# Patient Record
Sex: FEMALE | Race: BLACK OR AFRICAN AMERICAN | NOT HISPANIC OR LATINO | URBAN - METROPOLITAN AREA
[De-identification: names, ages, dates, MRNs, and addresses within clinical notes are randomized per-mention and may not be internally consistent; named-entity substitution may affect disease eponyms.]

---

## 2018-10-05 ENCOUNTER — OFFICE (OUTPATIENT)
Dept: URBAN - METROPOLITAN AREA CLINIC 19 | Facility: CLINIC | Age: 68
End: 2018-10-05

## 2018-10-05 VITALS
DIASTOLIC BLOOD PRESSURE: 89 MMHG | HEIGHT: 64 IN | SYSTOLIC BLOOD PRESSURE: 117 MMHG | WEIGHT: 188 LBS | HEART RATE: 96 BPM

## 2018-10-05 DIAGNOSIS — R63.4 ABNORMAL WEIGHT LOSS: ICD-10-CM

## 2018-10-05 DIAGNOSIS — R10.10 UPPER ABDOMINAL PAIN, UNSPECIFIED: ICD-10-CM

## 2018-10-05 DIAGNOSIS — R11.10 VOMITING, UNSPECIFIED: ICD-10-CM

## 2018-10-05 DIAGNOSIS — R10.13 EPIGASTRIC PAIN: ICD-10-CM

## 2018-10-05 DIAGNOSIS — R05 COUGH: ICD-10-CM

## 2018-10-05 DIAGNOSIS — Z86.010 PERSONAL HISTORY OF COLONIC POLYPS: ICD-10-CM

## 2018-10-05 DIAGNOSIS — M06.9 RHEUMATOID ARTHRITIS, UNSPECIFIED: ICD-10-CM

## 2018-10-05 DIAGNOSIS — K59.00 CONSTIPATION, UNSPECIFIED: ICD-10-CM

## 2018-10-05 DIAGNOSIS — R63.0 ANOREXIA: ICD-10-CM

## 2018-10-05 DIAGNOSIS — R14.0 ABDOMINAL DISTENSION (GASEOUS): ICD-10-CM

## 2018-10-05 PROCEDURE — 99204 OFFICE O/P NEW MOD 45 MIN: CPT | Performed by: INTERNAL MEDICINE

## 2018-10-05 RX ORDER — ONDANSETRON 4 MG/1
16 TABLET, ORALLY DISINTEGRATING ORAL
Qty: 60 | Refills: 3 | Status: COMPLETED
Start: 2018-10-05 | End: 2021-11-10

## 2018-10-05 RX ORDER — OMEPRAZOLE 20 MG/1
CAPSULE, DELAYED RELEASE ORAL
Qty: 0 | Refills: 0 | Status: COMPLETED
End: 2018-10-05

## 2018-10-05 RX ORDER — SODIUM PICOSULFATE, MAGNESIUM OXIDE, AND ANHYDROUS CITRIC ACID 10; 3.5; 12 MG/160ML; G/160ML; G/160ML
LIQUID ORAL
Qty: 1 | Refills: 0 | Status: COMPLETED
Start: 2018-10-05 | End: 2018-11-07

## 2018-10-05 RX ORDER — PANTOPRAZOLE SODIUM 40 MG/1
40 TABLET, DELAYED RELEASE ORAL
Qty: 30 | Refills: 11 | Status: COMPLETED
Start: 2018-10-05 | End: 2021-11-10

## 2018-11-07 ENCOUNTER — AMBULATORY SURGICAL CENTER (OUTPATIENT)
Dept: URBAN - METROPOLITAN AREA SURGERY 2 | Facility: SURGERY | Age: 68
End: 2018-11-07

## 2018-11-07 ENCOUNTER — AMBULATORY SURGICAL CENTER (OUTPATIENT)
Dept: URBAN - METROPOLITAN AREA SURGERY 2 | Facility: SURGERY | Age: 68
End: 2018-11-07
Payer: MEDICARE

## 2018-11-07 ENCOUNTER — OFFICE (OUTPATIENT)
Dept: URBAN - METROPOLITAN AREA PATHOLOGY 22 | Facility: PATHOLOGY | Age: 68
End: 2018-11-07

## 2018-11-07 VITALS
DIASTOLIC BLOOD PRESSURE: 76 MMHG | DIASTOLIC BLOOD PRESSURE: 76 MMHG | SYSTOLIC BLOOD PRESSURE: 110 MMHG | TEMPERATURE: 98.2 F | RESPIRATION RATE: 18 BRPM | TEMPERATURE: 98.6 F | DIASTOLIC BLOOD PRESSURE: 57 MMHG | HEART RATE: 76 BPM | HEART RATE: 95 BPM | HEART RATE: 99 BPM | HEIGHT: 64 IN | SYSTOLIC BLOOD PRESSURE: 114 MMHG | SYSTOLIC BLOOD PRESSURE: 115 MMHG | SYSTOLIC BLOOD PRESSURE: 110 MMHG | DIASTOLIC BLOOD PRESSURE: 57 MMHG | DIASTOLIC BLOOD PRESSURE: 67 MMHG | HEART RATE: 91 BPM | SYSTOLIC BLOOD PRESSURE: 97 MMHG | HEART RATE: 76 BPM | OXYGEN SATURATION: 97 % | TEMPERATURE: 98.6 F | SYSTOLIC BLOOD PRESSURE: 114 MMHG | RESPIRATION RATE: 20 BRPM | DIASTOLIC BLOOD PRESSURE: 71 MMHG | WEIGHT: 190 LBS | DIASTOLIC BLOOD PRESSURE: 67 MMHG | RESPIRATION RATE: 18 BRPM | OXYGEN SATURATION: 98 % | OXYGEN SATURATION: 98 % | OXYGEN SATURATION: 97 % | HEART RATE: 99 BPM | HEART RATE: 95 BPM | TEMPERATURE: 98.2 F | RESPIRATION RATE: 20 BRPM | SYSTOLIC BLOOD PRESSURE: 115 MMHG | SYSTOLIC BLOOD PRESSURE: 97 MMHG | DIASTOLIC BLOOD PRESSURE: 71 MMHG | OXYGEN SATURATION: 96 % | HEART RATE: 91 BPM | OXYGEN SATURATION: 96 % | WEIGHT: 190 LBS | HEIGHT: 64 IN

## 2018-11-07 DIAGNOSIS — K21.0 GASTRO-ESOPHAGEAL REFLUX DISEASE WITH ESOPHAGITIS: ICD-10-CM

## 2018-11-07 DIAGNOSIS — R11.10 VOMITING, UNSPECIFIED: ICD-10-CM

## 2018-11-07 DIAGNOSIS — R10.13 EPIGASTRIC PAIN: ICD-10-CM

## 2018-11-07 DIAGNOSIS — Z86.010 PERSONAL HISTORY OF COLONIC POLYPS: ICD-10-CM

## 2018-11-07 DIAGNOSIS — K62.89 OTHER SPECIFIED DISEASES OF ANUS AND RECTUM: ICD-10-CM

## 2018-11-07 DIAGNOSIS — K63.5 POLYP OF COLON: ICD-10-CM

## 2018-11-07 DIAGNOSIS — K31.7 POLYP OF STOMACH AND DUODENUM: ICD-10-CM

## 2018-11-07 DIAGNOSIS — K57.30 DIVERTICULOSIS OF LARGE INTESTINE WITHOUT PERFORATION OR ABS: ICD-10-CM

## 2018-11-07 DIAGNOSIS — K31.89 OTHER DISEASES OF STOMACH AND DUODENUM: ICD-10-CM

## 2018-11-07 DIAGNOSIS — K64.4 RESIDUAL HEMORRHOIDAL SKIN TAGS: ICD-10-CM

## 2018-11-07 PROBLEM — D12.2 BENIGN NEOPLASM OF ASCENDING COLON: Status: ACTIVE | Noted: 2018-11-07

## 2018-11-07 PROBLEM — K29.70 GASTRITIS, UNSPECIFIED, WITHOUT BLEEDING: Status: ACTIVE | Noted: 2018-11-07

## 2018-11-07 PROBLEM — D12.4 BENIGN NEOPLASM OF DESCENDING COLON: Status: ACTIVE | Noted: 2018-11-07

## 2018-11-07 PROCEDURE — G8907 PT DOC NO EVENTS ON DISCHARG: HCPCS | Performed by: INTERNAL MEDICINE

## 2018-11-07 PROCEDURE — 45380 COLONOSCOPY AND BIOPSY: CPT | Mod: PT | Performed by: INTERNAL MEDICINE

## 2018-11-07 PROCEDURE — 43239 EGD BIOPSY SINGLE/MULTIPLE: CPT | Mod: 51 | Performed by: INTERNAL MEDICINE

## 2018-11-07 PROCEDURE — G8918 PT W/O PREOP ORDER IV AB PRO: HCPCS | Performed by: INTERNAL MEDICINE

## 2018-11-07 PROCEDURE — 88341 IMHCHEM/IMCYTCHM EA ADD ANTB: CPT | Performed by: INTERNAL MEDICINE

## 2018-11-07 PROCEDURE — 45380 COLONOSCOPY AND BIOPSY: CPT | Performed by: INTERNAL MEDICINE

## 2018-11-07 PROCEDURE — 88342 IMHCHEM/IMCYTCHM 1ST ANTB: CPT | Performed by: INTERNAL MEDICINE

## 2018-11-07 PROCEDURE — 88313 SPECIAL STAINS GROUP 2: CPT | Performed by: INTERNAL MEDICINE

## 2018-11-07 PROCEDURE — 88305 TISSUE EXAM BY PATHOLOGIST: CPT | Performed by: INTERNAL MEDICINE

## 2019-07-26 ENCOUNTER — OFFICE (OUTPATIENT)
Dept: URBAN - METROPOLITAN AREA CLINIC 19 | Facility: CLINIC | Age: 69
End: 2019-07-26

## 2019-07-26 VITALS
WEIGHT: 185 LBS | HEIGHT: 64 IN | DIASTOLIC BLOOD PRESSURE: 90 MMHG | SYSTOLIC BLOOD PRESSURE: 120 MMHG | HEART RATE: 111 BPM

## 2019-07-26 DIAGNOSIS — C18.2 MALIGNANT NEOPLASM OF ASCENDING COLON: ICD-10-CM

## 2019-07-26 DIAGNOSIS — K58.0 IRRITABLE BOWEL SYNDROME WITH DIARRHEA: ICD-10-CM

## 2019-07-26 DIAGNOSIS — R10.9 UNSPECIFIED ABDOMINAL PAIN: ICD-10-CM

## 2019-07-26 PROCEDURE — 99213 OFFICE O/P EST LOW 20 MIN: CPT | Performed by: INTERNAL MEDICINE

## 2019-07-26 RX ORDER — DIPHENOXYLATE HYDROCHLORIDE AND ATROPINE SULFATE 2.5; .025 MG/1; MG/1
5 TABLET ORAL
Qty: 60 | Refills: 5 | Status: ACTIVE
Start: 2019-07-26

## 2019-11-26 ENCOUNTER — OFFICE (OUTPATIENT)
Dept: URBAN - METROPOLITAN AREA CLINIC 19 | Facility: CLINIC | Age: 69
End: 2019-11-26

## 2021-09-01 ENCOUNTER — OFFICE (OUTPATIENT)
Dept: URBAN - METROPOLITAN AREA CLINIC 19 | Facility: CLINIC | Age: 71
End: 2021-09-01

## 2021-09-01 VITALS
OXYGEN SATURATION: 95 % | DIASTOLIC BLOOD PRESSURE: 90 MMHG | HEIGHT: 64 IN | WEIGHT: 197 LBS | HEART RATE: 109 BPM | SYSTOLIC BLOOD PRESSURE: 125 MMHG

## 2021-09-01 DIAGNOSIS — R68.81 EARLY SATIETY: ICD-10-CM

## 2021-09-01 DIAGNOSIS — J93.83 OTHER PNEUMOTHORAX: ICD-10-CM

## 2021-09-01 DIAGNOSIS — R13.19 OTHER DYSPHAGIA: ICD-10-CM

## 2021-09-01 PROCEDURE — 99213 OFFICE O/P EST LOW 20 MIN: CPT | Performed by: INTERNAL MEDICINE

## 2021-09-01 RX ORDER — PANTOPRAZOLE SODIUM 40 MG/1
40 TABLET, DELAYED RELEASE ORAL
Qty: 30 | Refills: 11 | Status: COMPLETED
Start: 2018-10-05 | End: 2021-11-10

## 2021-11-10 ENCOUNTER — OFFICE (OUTPATIENT)
Dept: URBAN - METROPOLITAN AREA CLINIC 19 | Facility: CLINIC | Age: 71
End: 2021-11-10

## 2021-11-10 VITALS
WEIGHT: 206 LBS | SYSTOLIC BLOOD PRESSURE: 136 MMHG | HEIGHT: 64 IN | HEART RATE: 86 BPM | DIASTOLIC BLOOD PRESSURE: 85 MMHG | OXYGEN SATURATION: 97 %

## 2021-11-10 DIAGNOSIS — Z85.038 PERSONAL HISTORY OF OTHER MALIGNANT NEOPLASM OF LARGE INTEST: ICD-10-CM

## 2021-11-10 DIAGNOSIS — Z86.010 PERSONAL HISTORY OF COLONIC POLYPS: ICD-10-CM

## 2021-11-10 DIAGNOSIS — R13.19 OTHER DYSPHAGIA: ICD-10-CM

## 2021-11-10 PROCEDURE — 99213 OFFICE O/P EST LOW 20 MIN: CPT | Performed by: INTERNAL MEDICINE

## 2021-11-10 RX ORDER — PANTOPRAZOLE 40 MG/1
40 TABLET, DELAYED RELEASE ORAL
Qty: 90 | Refills: 3 | Status: ACTIVE
Start: 2021-11-10

## 2022-12-19 ENCOUNTER — OFFICE (OUTPATIENT)
Dept: URBAN - METROPOLITAN AREA CLINIC 19 | Facility: CLINIC | Age: 72
End: 2022-12-19

## 2022-12-19 VITALS
SYSTOLIC BLOOD PRESSURE: 139 MMHG | HEART RATE: 102 BPM | DIASTOLIC BLOOD PRESSURE: 93 MMHG | HEIGHT: 64 IN | OXYGEN SATURATION: 93 %

## 2022-12-19 DIAGNOSIS — Z85.038 PERSONAL HISTORY OF OTHER MALIGNANT NEOPLASM OF LARGE INTEST: ICD-10-CM

## 2022-12-19 DIAGNOSIS — K22.4 DYSKINESIA OF ESOPHAGUS: ICD-10-CM

## 2022-12-19 DIAGNOSIS — R14.0 ABDOMINAL DISTENSION (GASEOUS): ICD-10-CM

## 2022-12-19 PROCEDURE — 99213 OFFICE O/P EST LOW 20 MIN: CPT | Performed by: NURSE PRACTITIONER

## 2022-12-19 NOTE — SERVICENOTES
She is asymptomatic from GI standpoint. Due for colon cancer screening in November 2023. She is in recall system.

## 2022-12-19 NOTE — SERVICEHPINOTES
Mrs. Mckoy is a 72-year-old female previously followed for colon cancer. Was new to DR. Zepeda in 11/2018 for generalized bloating and discomfort. Discomfort more so in upper abdomen/epigastric region. Additionally, was having a few episodes of vomiting, early satiety with anorexia and lost 8 lbs. over the last few weeks due to loss of appetite. Colonoscopy completed 2018, anastomosis noted, small hyperplastic polyps. EGD with gastritis, GERD, and hyperplastic polyp (removed).Seen in September for evaluation for solid food dysphagia, upper/mid-sternum for 2 months.   This is in the setting of being off her previously prescribed PPI.  No obvious reason why she has been off such.UPDATE 11/2021:brEsophagram planned and PPI resumed. Her symptoms have resolved. esophagram not completed. No new GI issues 
br
br   UPDATE 12/19/22:
br
lio No pyrosis, dysphagia, odynophagia, AP, melena, hematochezia, weight loss, anorexia. She does have bloating which is worse after eating. She has a BM every 3-4 days. She has early satiety for a "while." She eats about once per day, not new for her.
br
lio
She had lab work with her PCP earlier this year- all normal. 
br
Leannahe takes Naprosyn for arthritis about 4x's per week. lio
br
Started taking metoprolol 1/2 tab once in a.m., second dose in p.m. for tachycardia. No fatigue. 
lio vaca She sees cardiologist, last seen sometime this year. No stress test or heart cath.
lio vaca
She takes pantoprazole every day.
lio vaca
She did have an esophagram in 12/2021 which revealed dysmotiliy and spasm. (4) no limitation

## 2023-12-19 ENCOUNTER — OFFICE (OUTPATIENT)
Dept: URBAN - METROPOLITAN AREA CLINIC 19 | Facility: CLINIC | Age: 73
End: 2023-12-19

## 2023-12-19 VITALS
HEART RATE: 80 BPM | DIASTOLIC BLOOD PRESSURE: 95 MMHG | OXYGEN SATURATION: 100 % | SYSTOLIC BLOOD PRESSURE: 152 MMHG | WEIGHT: 189 LBS | HEIGHT: 64 IN

## 2023-12-19 DIAGNOSIS — E86.0 DEHYDRATION: ICD-10-CM

## 2023-12-19 DIAGNOSIS — R11.2 NAUSEA WITH VOMITING, UNSPECIFIED: ICD-10-CM

## 2023-12-19 DIAGNOSIS — R42 DIZZINESS AND GIDDINESS: ICD-10-CM

## 2023-12-19 DIAGNOSIS — K52.9 NONINFECTIVE GASTROENTERITIS AND COLITIS, UNSPECIFIED: ICD-10-CM

## 2023-12-19 PROCEDURE — 99214 OFFICE O/P EST MOD 30 MIN: CPT | Performed by: NURSE PRACTITIONER

## 2023-12-19 NOTE — SERVICENOTES
Sent to Dr. Zepeda for review. Not feeling well, unable to tolerate abx or her regular meds with continued symptoms. Likely infectious in etiology with recent travel to [facility].

## 2023-12-19 NOTE — SERVICEHPINOTES
PREVIOUS HISTORY BY DR. ZEPEDA:lio vaca Mrs. Mckoy is a 72-year-old female previously followed for colon cancer. Was new to DR. Zepeda in 11/2018 for generalized bloating and discomfort. Discomfort more so in upper abdomen/epigastric region. Additionally, was having a few episodes of vomiting, early satiety with anorexia and lost 8 lbs. over the last few weeks due to loss of appetite. Colonoscopy completed 2018, anastomosis noted, small hyperplastic polyps. EGD with gastritis, GERD, and hyperplastic polyp (removed).Seen in September for evaluation for solid food dysphagia, upper/mid-sternum for 2 months.   This is in the setting of being off her previously prescribed PPI.  No obvious reason why she has been off such.UPDATE 11/2021 BY DR. ZEPEDA:brEsophagram planned and PPI resumed. Her symptoms have resolved. esophagram not completed. No new GI issuesUPDATE 12/19/22 BY WES FUENTES:No pyrosis, dysphagia, odynophagia, AP, melena, hematochezia, weight loss, anorexia. She does have bloating which is worse after eating. She has a BM every 3-4 days. She has early satiety for a "while." She eats about once per day, not new for her.She had lab work with her PCP earlier this year- all normal. She takes Naprosyn for arthritis about 4x's per week. brStarted taking metoprolol 1/2 tab once in a.m., second dose in p.m. for tachycardia. No fatigue. She sees cardiologist, last seen sometime this year. No stress test or heart cath. She takes pantoprazole every day.brShe did have an esophagram in 12/2021 which revealed dysmotiliy and spasm. 
br
lio   Recommendations included continue PPI, follow-up in one year.
lio vaca UPDATE BY WES FUENTES 12/19/23:
lio vaca Ms. Mckoy presents to clinic today with her grandson Lei for hospital follow-up. She had the onset nausea / vomiting and diarrhea approximately 1 week ago.  She proceeded to the emergency room 2 days after having nausea and vomiting with diarrhea that was intractable. Records reviewed. lio vaca She was seen at [facility]  on 12/13/23.  She was not admitted but she was given IV fluid and had lab work and a CT abdomen and pelvis without contrast.  Her white blood cell count was 7.5, hemoglobin 13.4. CMP was normal except for a potassium of 2.9, BUN 25/creatinine 1.30, AST 58, GFR 48. Lipase ok. CT revealed  diffuse colonic wall thickening and inflammatory changes mostly in the transverse and descending colon consistent with colitis, interval increase in the size of bilateral inferior pole renal cyst.
lio Ramyehuda is not feeling well today, has dizziness.  She has not been able to tolerate the ciprofloxacin or Flagyl due to recurrent nausea and vomiting with diarrhea.  She has not been able to take her regular medicines either.  She is urinating maybe 3 or 4 times a day.  She did try the Zofran , drinking water and ginger ale.  She is still having some cramping every now and then.
lio vacaNo hematochezia, melena, hematemesis or coffee-ground emesis.  Notably she went to [place] at the beginning of this month December 1st through the 8th.  Her roommate was also sick.  
lio vaca We discussed managing this as an outpatient with IV fluids, trying a different oral antibiotic regimen, etc. She and I both believe she needs to go to the emergency room.

## 2024-02-27 ENCOUNTER — OFFICE (OUTPATIENT)
Dept: URBAN - METROPOLITAN AREA CLINIC 19 | Facility: CLINIC | Age: 74
End: 2024-02-27
Payer: MEDICARE

## 2024-02-27 VITALS
DIASTOLIC BLOOD PRESSURE: 88 MMHG | SYSTOLIC BLOOD PRESSURE: 139 MMHG | HEART RATE: 78 BPM | HEIGHT: 65 IN | OXYGEN SATURATION: 99 % | WEIGHT: 189 LBS

## 2024-02-27 DIAGNOSIS — K21.9 GASTRO-ESOPHAGEAL REFLUX DISEASE WITHOUT ESOPHAGITIS: ICD-10-CM

## 2024-02-27 DIAGNOSIS — K52.9 NONINFECTIVE GASTROENTERITIS AND COLITIS, UNSPECIFIED: ICD-10-CM

## 2024-02-27 DIAGNOSIS — Z85.038 PERSONAL HISTORY OF OTHER MALIGNANT NEOPLASM OF LARGE INTEST: ICD-10-CM

## 2024-02-27 DIAGNOSIS — Z86.19 PERSONAL HISTORY OF OTHER INFECTIOUS AND PARASITIC DISEASES: ICD-10-CM

## 2024-02-27 DIAGNOSIS — Z86.010 PERSONAL HISTORY OF COLONIC POLYPS: ICD-10-CM

## 2024-02-27 PROCEDURE — 99213 OFFICE O/P EST LOW 20 MIN: CPT | Performed by: NURSE PRACTITIONER

## 2024-02-27 RX ORDER — SODIUM PICOSULFATE, MAGNESIUM OXIDE, AND ANHYDROUS CITRIC ACID 10; 3.5; 12 MG/160ML; G/160ML; G/160ML
LIQUID ORAL
Qty: 350 | Refills: 0 | Status: ACTIVE
Start: 2024-02-27

## 2024-02-27 NOTE — SERVICEHPINOTES
PREVIOUS HISTORY BY DR. ZEPEDA:Mrs. Mckoy is a 72-year-old female previously followed for colon cancer. Was new to Dr. Zepeda in 11/2018 for generalized bloating and discomfort. Discomfort more so in upper abdomen/epigastric region. Additionally, was having a few episodes of vomiting, early satiety with anorexia and lost 8 lbs. over the last few weeks due to loss of appetite. Colonoscopy completed 2018, anastomosis noted, small hyperplastic polyps. EGD with gastritis, GERD, and hyperplastic polyp (removed).Seen in September for evaluation for solid food dysphagia, upper/mid-sternum for 2 months.   This is in the setting of being off her previously prescribed PPI.  No obvious reason why she has been off such.UPDATE 11/2021 BY DR. ZEPEDA:brEsophagram planned and PPI resumed. Her symptoms have resolved. esophagram not completed. No new GI issuesUPDATE 12/19/22 BY WES FUENTES:brNo pyrosis, dysphagia, odynophagia, AP, melena, hematochezia, weight loss, anorexia. She does have bloating which is worse after eating. She has a BM every 3-4 days. She has early satiety for a "while." She eats about once per day, not new for her.She had lab work with her PCP earlier this year- all normal. She takes Naprosyn for arthritis about 4x's per week. brStarted taking metoprolol 1/2 tab once in a.m., second dose in p.m. for tachycardia. No fatigue. brShe sees cardiologist, last seen sometime this year. No stress test or heart cath. She takes pantoprazole every day.brSyehuda did have an esophagram in 12/2021 which revealed dysmotiliy and spasm.Recommendations included continue PPI, follow-up in one year.UPDATE BY WES FUENTES 12/19/23:Niki Mckoy presents to clinic today with her grandson Lei for hospital follow-up. She had the onset nausea / vomiting and diarrhea approximately 1 week ago.  She proceeded to the emergency room 2 days after having nausea and vomiting with diarrhea that was intractable. Records reviewed. She was seen at [facility]  on 12/13/23.  She was not admitted but she was given IV fluid and had lab work and a CT abdomen and pelvis without contrast.  Her white blood cell count was 7.5, hemoglobin 13.4. CMP was normal except for a potassium of 2.9, BUN 25/creatinine 1.30, AST 58, GFR 48. Lipase ok. CT revealed  diffuse colonic wall thickening and inflammatory changes mostly in the transverse and descending colon consistent with colitis, interval increase in the size of bilateral inferior pole renal cyst.She is not feeling well today, has dizziness.  She has not been able to tolerate the ciprofloxacin or Flagyl due to recurrent nausea and vomiting with diarrhea.  She has not been able to take her regular medicines either.  She is urinating maybe 3 or 4 times a day.  She did try the Zofran , drinking water and ginger ale.  She is still having some cramping every now and then.No hematochezia, melena, hematemesis or coffee-ground emesis.  Notably she went to [place] at the beginning of this month December 1st through the 8th.  Her roommate was also sick. We discussed managing this as an outpatient with IV fluids, trying a different oral antibiotic regimen, etc. She and I both believe she needs to go to the emergency room. 
lio vaca   UPDATE BY WES FUENTES 2/27/24:
lio vaca Ms. Mckoy presents to clinic today to discuss colonoscopy, recent hospitalization in Dec 2023. I sent her to the ER on 12/19/23 and she was inpatient for 5 days at [facility]. States she received abx, IVF, and sent home on p.o. abx. Please see note above.lio Ramyehuda is doing much better overall.  She is eating well now.  No melena, hematochezia, diarrhea, constipation, abdominal pain, weight loss, nausea, heartburn, dysphagia, odynophagia, anorexia.  She does have some fatigue.  She takes Tylenol only no NSAID use.lio

## 2024-04-05 ENCOUNTER — OFFICE (OUTPATIENT)
Dept: URBAN - METROPOLITAN AREA PATHOLOGY 12 | Facility: PATHOLOGY | Age: 74
End: 2024-04-05
Payer: MEDICARE

## 2024-04-05 ENCOUNTER — AMBULATORY SURGICAL CENTER (OUTPATIENT)
Dept: URBAN - METROPOLITAN AREA SURGERY 3 | Facility: SURGERY | Age: 74
End: 2024-04-05
Payer: MEDICARE

## 2024-04-05 VITALS
SYSTOLIC BLOOD PRESSURE: 127 MMHG | DIASTOLIC BLOOD PRESSURE: 87 MMHG | TEMPERATURE: 98 F | DIASTOLIC BLOOD PRESSURE: 74 MMHG | DIASTOLIC BLOOD PRESSURE: 84 MMHG | HEART RATE: 110 BPM | DIASTOLIC BLOOD PRESSURE: 87 MMHG | RESPIRATION RATE: 18 BRPM | DIASTOLIC BLOOD PRESSURE: 93 MMHG | RESPIRATION RATE: 22 BRPM | HEART RATE: 110 BPM | HEART RATE: 110 BPM | SYSTOLIC BLOOD PRESSURE: 125 MMHG | TEMPERATURE: 98 F | RESPIRATION RATE: 20 BRPM | SYSTOLIC BLOOD PRESSURE: 132 MMHG | SYSTOLIC BLOOD PRESSURE: 125 MMHG | OXYGEN SATURATION: 99 % | TEMPERATURE: 97.7 F | OXYGEN SATURATION: 97 % | OXYGEN SATURATION: 100 % | RESPIRATION RATE: 20 BRPM | WEIGHT: 189 LBS | RESPIRATION RATE: 19 BRPM | RESPIRATION RATE: 18 BRPM | DIASTOLIC BLOOD PRESSURE: 84 MMHG | DIASTOLIC BLOOD PRESSURE: 74 MMHG | DIASTOLIC BLOOD PRESSURE: 84 MMHG | RESPIRATION RATE: 20 BRPM | HEIGHT: 65 IN | WEIGHT: 189 LBS | DIASTOLIC BLOOD PRESSURE: 74 MMHG | OXYGEN SATURATION: 99 % | TEMPERATURE: 98 F | RESPIRATION RATE: 18 BRPM | WEIGHT: 189 LBS | OXYGEN SATURATION: 100 % | SYSTOLIC BLOOD PRESSURE: 127 MMHG | OXYGEN SATURATION: 99 % | RESPIRATION RATE: 19 BRPM | DIASTOLIC BLOOD PRESSURE: 87 MMHG | HEART RATE: 90 BPM | SYSTOLIC BLOOD PRESSURE: 132 MMHG | HEART RATE: 98 BPM | TEMPERATURE: 97.7 F | HEART RATE: 90 BPM | OXYGEN SATURATION: 97 % | DIASTOLIC BLOOD PRESSURE: 93 MMHG | RESPIRATION RATE: 22 BRPM | HEART RATE: 98 BPM | HEART RATE: 90 BPM | HEIGHT: 65 IN | DIASTOLIC BLOOD PRESSURE: 93 MMHG | HEART RATE: 98 BPM | HEIGHT: 65 IN | SYSTOLIC BLOOD PRESSURE: 125 MMHG | SYSTOLIC BLOOD PRESSURE: 127 MMHG | TEMPERATURE: 97.7 F | SYSTOLIC BLOOD PRESSURE: 132 MMHG | RESPIRATION RATE: 19 BRPM | OXYGEN SATURATION: 97 % | OXYGEN SATURATION: 100 % | RESPIRATION RATE: 22 BRPM

## 2024-04-05 DIAGNOSIS — D12.3 BENIGN NEOPLASM OF TRANSVERSE COLON: ICD-10-CM

## 2024-04-05 DIAGNOSIS — Z08 ENCOUNTER FOR FOLLOW-UP EXAMINATION AFTER COMPLETED TREATMEN: ICD-10-CM

## 2024-04-05 DIAGNOSIS — Z85.038 PERSONAL HISTORY OF OTHER MALIGNANT NEOPLASM OF LARGE INTEST: ICD-10-CM

## 2024-04-05 DIAGNOSIS — Z86.010 PERSONAL HISTORY OF COLONIC POLYPS: ICD-10-CM

## 2024-04-05 DIAGNOSIS — K57.30 DIVERTICULOSIS OF LARGE INTESTINE WITHOUT PERFORATION OR ABS: ICD-10-CM

## 2024-04-05 DIAGNOSIS — Z09 ENCOUNTER FOR FOLLOW-UP EXAMINATION AFTER COMPLETED TREATMEN: ICD-10-CM

## 2024-04-05 PROBLEM — K63.5 POLYP OF COLON: Status: ACTIVE | Noted: 2024-04-05

## 2024-04-05 PROCEDURE — 45380 COLONOSCOPY AND BIOPSY: CPT | Mod: PT | Performed by: INTERNAL MEDICINE

## 2024-04-05 PROCEDURE — 88305 TISSUE EXAM BY PATHOLOGIST: CPT | Performed by: STUDENT IN AN ORGANIZED HEALTH CARE EDUCATION/TRAINING PROGRAM

## 2024-04-09 LAB
GASTRO ONE PATHOLOGY: PDF REPORT: (no result)

## 2025-03-03 ENCOUNTER — OFFICE (OUTPATIENT)
Dept: URBAN - METROPOLITAN AREA CLINIC 19 | Facility: CLINIC | Age: 75
End: 2025-03-03
Payer: MEDICARE

## 2025-03-03 VITALS
DIASTOLIC BLOOD PRESSURE: 88 MMHG | HEART RATE: 115 BPM | HEIGHT: 65 IN | OXYGEN SATURATION: 97 % | SYSTOLIC BLOOD PRESSURE: 125 MMHG | WEIGHT: 200 LBS

## 2025-03-03 DIAGNOSIS — Z85.038 PERSONAL HISTORY OF OTHER MALIGNANT NEOPLASM OF LARGE INTEST: ICD-10-CM

## 2025-03-03 DIAGNOSIS — K21.00 GASTRO-ESOPHAGEAL REFLUX DISEASE WITH ESOPHAGITIS, WITHOUT B: ICD-10-CM

## 2025-03-03 PROBLEM — K21.0 GASTRO-ESOPHAGEAL REFLUX DISEASE WITH ESOPHAGITIS: Status: ACTIVE | Noted: 2018-11-07

## 2025-03-03 PROCEDURE — 99213 OFFICE O/P EST LOW 20 MIN: CPT | Performed by: NURSE PRACTITIONER

## 2025-03-03 RX ORDER — PANTOPRAZOLE 40 MG/1
40 TABLET, DELAYED RELEASE ORAL
Qty: 90 | Refills: 3 | Status: ACTIVE
Start: 2021-11-10